# Patient Record
Sex: FEMALE | Race: WHITE | Employment: FULL TIME | URBAN - METROPOLITAN AREA
[De-identification: names, ages, dates, MRNs, and addresses within clinical notes are randomized per-mention and may not be internally consistent; named-entity substitution may affect disease eponyms.]

---

## 2020-12-29 PROCEDURE — 88305 TISSUE EXAM BY PATHOLOGIST: CPT | Performed by: OBSTETRICS & GYNECOLOGY

## 2022-01-20 PROBLEM — N96 RECURRENT PREGNANCY LOSS: Status: ACTIVE | Noted: 2022-01-20

## 2025-04-24 LAB — AMB EXT STREP B CULTURE: NEGATIVE

## 2025-05-12 ENCOUNTER — TELEPHONE (OUTPATIENT)
Dept: OBGYN UNIT | Facility: HOSPITAL | Age: 35
End: 2025-05-12

## 2025-05-12 RX ORDER — ASPIRIN 81 MG/1
81 TABLET, CHEWABLE ORAL DAILY
COMMUNITY
End: 2025-05-18

## 2025-05-15 ENCOUNTER — HOSPITAL ENCOUNTER (INPATIENT)
Facility: HOSPITAL | Age: 35
LOS: 3 days | Discharge: HOME | End: 2025-05-18
Attending: OBSTETRICS & GYNECOLOGY | Admitting: OBSTETRICS & GYNECOLOGY
Payer: COMMERCIAL

## 2025-05-15 ENCOUNTER — APPOINTMENT (OUTPATIENT)
Dept: OBGYN CLINIC | Facility: HOSPITAL | Age: 35
End: 2025-05-15
Attending: OBSTETRICS & GYNECOLOGY
Payer: COMMERCIAL

## 2025-05-15 PROBLEM — Z34.90 PREGNANCY (HCC): Status: ACTIVE | Noted: 2025-05-15

## 2025-05-15 LAB
ANTIBODY SCREEN: NEGATIVE
BASOPHILS # BLD AUTO: 0.01 X10(3) UL (ref 0–0.2)
BASOPHILS NFR BLD AUTO: 0.1 %
DEPRECATED RDW RBC AUTO: 41.9 FL (ref 35.1–46.3)
EOSINOPHIL # BLD AUTO: 0.04 X10(3) UL (ref 0–0.7)
EOSINOPHIL NFR BLD AUTO: 0.4 %
ERYTHROCYTE [DISTWIDTH] IN BLOOD BY AUTOMATED COUNT: 13.5 % (ref 11–15)
HCT VFR BLD AUTO: 35.8 % (ref 35–48)
HGB BLD-MCNC: 12 G/DL (ref 12–16)
IMM GRANULOCYTES # BLD AUTO: 0.03 X10(3) UL (ref 0–1)
IMM GRANULOCYTES NFR BLD: 0.3 %
LYMPHOCYTES # BLD AUTO: 1.98 X10(3) UL (ref 1–4)
LYMPHOCYTES NFR BLD AUTO: 20.9 %
MCH RBC QN AUTO: 28.9 PG (ref 26–34)
MCHC RBC AUTO-ENTMCNC: 33.5 G/DL (ref 31–37)
MCV RBC AUTO: 86.3 FL (ref 80–100)
MONOCYTES # BLD AUTO: 0.6 X10(3) UL (ref 0.1–1)
MONOCYTES NFR BLD AUTO: 6.3 %
NEUTROPHILS # BLD AUTO: 6.83 X10 (3) UL (ref 1.5–7.7)
NEUTROPHILS # BLD AUTO: 6.83 X10(3) UL (ref 1.5–7.7)
NEUTROPHILS NFR BLD AUTO: 72 %
PLATELET # BLD AUTO: 288 10(3)UL (ref 150–450)
RBC # BLD AUTO: 4.15 X10(6)UL (ref 3.8–5.3)
RH BLOOD TYPE: POSITIVE
RH BLOOD TYPE: POSITIVE
T PALLIDUM AB SER QL IA: NONREACTIVE
WBC # BLD AUTO: 9.5 X10(3) UL (ref 4–11)

## 2025-05-15 PROCEDURE — 86901 BLOOD TYPING SEROLOGIC RH(D): CPT | Performed by: OBSTETRICS & GYNECOLOGY

## 2025-05-15 PROCEDURE — 86850 RBC ANTIBODY SCREEN: CPT | Performed by: OBSTETRICS & GYNECOLOGY

## 2025-05-15 PROCEDURE — 86780 TREPONEMA PALLIDUM: CPT | Performed by: OBSTETRICS & GYNECOLOGY

## 2025-05-15 PROCEDURE — 86900 BLOOD TYPING SEROLOGIC ABO: CPT | Performed by: OBSTETRICS & GYNECOLOGY

## 2025-05-15 PROCEDURE — 85025 COMPLETE CBC W/AUTO DIFF WBC: CPT | Performed by: OBSTETRICS & GYNECOLOGY

## 2025-05-15 PROCEDURE — 3E0P7VZ INTRODUCTION OF HORMONE INTO FEMALE REPRODUCTIVE, VIA NATURAL OR ARTIFICIAL OPENING: ICD-10-PCS | Performed by: OBSTETRICS & GYNECOLOGY

## 2025-05-15 RX ORDER — TERBUTALINE SULFATE 1 MG/ML
0.25 INJECTION SUBCUTANEOUS AS NEEDED
Status: DISCONTINUED | OUTPATIENT
Start: 2025-05-15 | End: 2025-05-16

## 2025-05-15 RX ORDER — ACETAMINOPHEN 500 MG
500 TABLET ORAL EVERY 6 HOURS PRN
Status: DISCONTINUED | OUTPATIENT
Start: 2025-05-15 | End: 2025-05-16

## 2025-05-15 RX ORDER — HYDROXYZINE HYDROCHLORIDE 50 MG/ML
50 INJECTION, SOLUTION INTRAMUSCULAR EVERY 4 HOURS PRN
Status: DISCONTINUED | OUTPATIENT
Start: 2025-05-15 | End: 2025-05-16

## 2025-05-15 RX ORDER — ACETAMINOPHEN 500 MG
1000 TABLET ORAL EVERY 6 HOURS PRN
Status: DISCONTINUED | OUTPATIENT
Start: 2025-05-15 | End: 2025-05-16

## 2025-05-15 RX ORDER — NALBUPHINE HYDROCHLORIDE 10 MG/ML
10 INJECTION INTRAMUSCULAR; INTRAVENOUS; SUBCUTANEOUS EVERY 4 HOURS PRN
Status: DISCONTINUED | OUTPATIENT
Start: 2025-05-15 | End: 2025-05-16

## 2025-05-15 RX ORDER — SODIUM CHLORIDE, SODIUM LACTATE, POTASSIUM CHLORIDE, CALCIUM CHLORIDE 600; 310; 30; 20 MG/100ML; MG/100ML; MG/100ML; MG/100ML
INJECTION, SOLUTION INTRAVENOUS AS NEEDED
Status: DISCONTINUED | OUTPATIENT
Start: 2025-05-15 | End: 2025-05-16

## 2025-05-15 RX ORDER — LIDOCAINE HYDROCHLORIDE 10 MG/ML
30 INJECTION, SOLUTION EPIDURAL; INFILTRATION; INTRACAUDAL; PERINEURAL ONCE
Status: DISCONTINUED | OUTPATIENT
Start: 2025-05-15 | End: 2025-05-16

## 2025-05-15 RX ORDER — DEXTROSE, SODIUM CHLORIDE, SODIUM LACTATE, POTASSIUM CHLORIDE, AND CALCIUM CHLORIDE 5; .6; .31; .03; .02 G/100ML; G/100ML; G/100ML; G/100ML; G/100ML
INJECTION, SOLUTION INTRAVENOUS CONTINUOUS
Status: DISCONTINUED | OUTPATIENT
Start: 2025-05-15 | End: 2025-05-16

## 2025-05-15 RX ORDER — CITRIC ACID/SODIUM CITRATE 334-500MG
30 SOLUTION, ORAL ORAL AS NEEDED
Status: DISCONTINUED | OUTPATIENT
Start: 2025-05-15 | End: 2025-05-16

## 2025-05-15 RX ORDER — ONDANSETRON 2 MG/ML
4 INJECTION INTRAMUSCULAR; INTRAVENOUS EVERY 6 HOURS PRN
Status: DISCONTINUED | OUTPATIENT
Start: 2025-05-15 | End: 2025-05-16

## 2025-05-15 RX ORDER — IBUPROFEN 600 MG/1
600 TABLET, FILM COATED ORAL ONCE AS NEEDED
Status: DISCONTINUED | OUTPATIENT
Start: 2025-05-15 | End: 2025-05-16

## 2025-05-16 ENCOUNTER — ANESTHESIA EVENT (OUTPATIENT)
Dept: OBGYN UNIT | Facility: HOSPITAL | Age: 35
End: 2025-05-16
Payer: COMMERCIAL

## 2025-05-16 ENCOUNTER — ANESTHESIA (OUTPATIENT)
Dept: OBGYN UNIT | Facility: HOSPITAL | Age: 35
End: 2025-05-16
Payer: COMMERCIAL

## 2025-05-16 PROCEDURE — 0HQ9XZZ REPAIR PERINEUM SKIN, EXTERNAL APPROACH: ICD-10-PCS | Performed by: OBSTETRICS & GYNECOLOGY

## 2025-05-16 RX ORDER — AMMONIA 15 % (W/V)
0.3 AMPUL (EA) INHALATION AS NEEDED
Status: DISCONTINUED | OUTPATIENT
Start: 2025-05-16 | End: 2025-05-18

## 2025-05-16 RX ORDER — LIDOCAINE HYDROCHLORIDE 10 MG/ML
INJECTION, SOLUTION INFILTRATION; PERINEURAL
Status: COMPLETED | OUTPATIENT
Start: 2025-05-16 | End: 2025-05-16

## 2025-05-16 RX ORDER — CHOLECALCIFEROL (VITAMIN D3) 25 MCG
1 TABLET,CHEWABLE ORAL DAILY
Status: DISCONTINUED | OUTPATIENT
Start: 2025-05-16 | End: 2025-05-18

## 2025-05-16 RX ORDER — BISACODYL 10 MG
10 SUPPOSITORY, RECTAL RECTAL ONCE AS NEEDED
Status: DISCONTINUED | OUTPATIENT
Start: 2025-05-16 | End: 2025-05-18

## 2025-05-16 RX ORDER — DOCUSATE SODIUM 100 MG/1
100 CAPSULE, LIQUID FILLED ORAL
Status: DISCONTINUED | OUTPATIENT
Start: 2025-05-16 | End: 2025-05-18

## 2025-05-16 RX ORDER — SIMETHICONE 80 MG
80 TABLET,CHEWABLE ORAL 3 TIMES DAILY PRN
Status: DISCONTINUED | OUTPATIENT
Start: 2025-05-16 | End: 2025-05-18

## 2025-05-16 RX ORDER — ACETAMINOPHEN 500 MG
1000 TABLET ORAL EVERY 6 HOURS PRN
Status: DISCONTINUED | OUTPATIENT
Start: 2025-05-16 | End: 2025-05-18

## 2025-05-16 RX ORDER — LIDOCAINE HYDROCHLORIDE AND EPINEPHRINE 15; 5 MG/ML; UG/ML
INJECTION, SOLUTION EPIDURAL
Status: COMPLETED | OUTPATIENT
Start: 2025-05-16 | End: 2025-05-16

## 2025-05-16 RX ORDER — NALBUPHINE HYDROCHLORIDE 10 MG/ML
2.5 INJECTION INTRAMUSCULAR; INTRAVENOUS; SUBCUTANEOUS
Status: DISCONTINUED | OUTPATIENT
Start: 2025-05-16 | End: 2025-05-16

## 2025-05-16 RX ORDER — IBUPROFEN 600 MG/1
600 TABLET, FILM COATED ORAL EVERY 6 HOURS
Status: DISCONTINUED | OUTPATIENT
Start: 2025-05-16 | End: 2025-05-18

## 2025-05-16 RX ORDER — ACETAMINOPHEN 500 MG
500 TABLET ORAL EVERY 6 HOURS PRN
Status: DISCONTINUED | OUTPATIENT
Start: 2025-05-16 | End: 2025-05-18

## 2025-05-16 RX ORDER — BUPIVACAINE HCL/0.9 % NACL/PF 0.25 %
5 PLASTIC BAG, INJECTION (ML) EPIDURAL AS NEEDED
Status: DISCONTINUED | OUTPATIENT
Start: 2025-05-16 | End: 2025-05-16

## 2025-05-16 RX ORDER — BUPIVACAINE HYDROCHLORIDE 2.5 MG/ML
20 INJECTION, SOLUTION EPIDURAL; INFILTRATION; INTRACAUDAL; PERINEURAL ONCE
Status: DISCONTINUED | OUTPATIENT
Start: 2025-05-16 | End: 2025-05-16

## 2025-05-16 RX ADMIN — LIDOCAINE HYDROCHLORIDE 5 ML: 10 INJECTION, SOLUTION INFILTRATION; PERINEURAL at 03:03:00

## 2025-05-16 RX ADMIN — LIDOCAINE HYDROCHLORIDE AND EPINEPHRINE 5 ML: 15; 5 INJECTION, SOLUTION EPIDURAL at 03:03:00

## 2025-05-16 NOTE — L&D DELIVERY NOTE
Jonny Nichols [[id number]]      Labor Events     labor?: No   steroids?: None  Antibiotics received during labor?: No  Rupture date/time: 2025 0400     Rupture type: SROM  Fluid color: Pink  Labor type: Induced Onset of Labor  Induction: Misoprostol, Oxytocin  Indications for induction: Other - comment  Induction comment: AMA, high BMI  Intrapartum & labor complications: None       Labor Event Times    Labor onset date/time: 2025 0300  Dilation complete date/time: 2025 0712  Start pushing date/time: 2025 07:15       [place] Presentation    Presentation: Vertex  Position: Occiput Anterior       Operative Delivery    Operative Vaginal Delivery: No                Shoulder Dystocia    Shoulder Dystocia: No       Anesthesia    Method: Epidural              [place] Delivery      Head delivery date/time: 2025 07:20:53   Delivery date/time:  25 07:21:12   Delivery type: Normal spontaneous vaginal delivery    Details:     Delivery location: delivery room  Delivery Room Temperature: 68       Delivery Providers    Delivering Clinician: Yareli Domingo MD   Delivery personnel:  Provider Role   Rekha Castillo, RN Baby Nurse   Gaby Brizuela RN Delivery Nurse   Katy Arthur PCT             Cord    Vessels: 3 Vessels  Complications: Nuchal  # of loops: 1  Timed cord clamping: Yes  Time in sec: 120  Cord blood disposition: to lab  Gases sent?: Yes       Resuscitation    Method: None        Measurements      Weight: 3090 g 6 lb 13 oz Length: 50.2 cm     Head circum.: 35 cm              Placenta    Date/time: 2025 07:32  Removal: Spontaneous  Appearance: Intact  Disposition: Discarded       Apgars    Living status: Living   Apgar Scoring Key:    0 1 2    Skin color Blue or pale Acrocyanotic Completely pink    Heart rate Absent <100 bpm >100 bpm    Reflex irritability No response Grimace Cry or active withdrawal    Muscle tone Limp Some flexion Active  motion    Respiratory effort Absent Weak cry; hypoventilation Good, crying              1 Minute:  5 Minute:  10 Minute:  15 Minute:  20 Minute:      Skin color: 0  1       Heart rate: 2  2       Reflex irritablity: 2  2       Muscle tone: 2  2       Respiratory effort: 2  2       Total: 8  9          Apgars assigned by: KATIE VALLEJO   disposition: with mother       Skin to Skin    Skin to skin initiated date/time: 2025 0735  Skin to skin with: Mother       Vaginal Count    Initial count RN: Jamee Brizuela RN  Initial count Tech: Chun, Lisa   Sponges   Sharps    Initial counts 10   0    Final counts 10   1    Final count RN: Gaby Brizuela RN  Final count MD: Yareli Domingo MD       Lacerations    Episiotomy: None  Perineal lacerations: None      Vaginal laceration?: Yes Repaired?: Yes     Cervical laceration?: No    Clitoral laceration?: No    Quantitative blood loss (mL): 50            [facility]  part of [facility]    Vaginal Delivery Note    Sondra Nichols Patient Status:  Inpatient    3/25/1990 MRN [id number]   Location [facility] Attending Linda Pacheco MD   Hosp Day # 1 PCP ARAVIND LIU     Delivery       Diagnosis: IOL, AMA, BMI 45, recurrent pregnancy loss    Labor Details: Induction    Procedure: spontaneous vaginal delivery    Surgeon: Yareli Domingo MD    Neonatologist Present: no    Maternal Anesthesia: epidural     Infant  Date of Delivery: 2025   Time of Delivery: 7:21 AM  Delivery Type: Normal spontaneous vaginal delivery    Infant Sex  Information for the patient's :  Jonny Nichols [[id number]]   male    Infant Birthweight  Information for the patient's :  Jonny Nichols [[id number]]   6 lb 13 oz (3.09 kg)       Presentation Vertex [1]  Position   Occiput [1] Anterior [1]    Apgars:  1 minute: 8               5 minutes: 9                        10 minutes:      Placenta:  Date/Time of Delivery:  5/16/2025  7:32 AM   Delivery: spontaneous  Placenta to Pathology: no    Umbilical Cord:  Cord Gases Submitted: yes  Cord Blood/Tissue Collection: no  Cord Complications: single nuchal resolved at the perineum  Sponge and Needle Counts:  Verified yes      Episiotomy/Laceration Repair  Laceration: vaginal 1st degree  Location: midline near the posterior forchette  Suture Size/Type: 2-0 Vicryl  Anesthesia: Epidural in place  Repair Comments: Good approximation and good hemostasis obtained.    Delivery Complications  none      Hemorrhage?: No, patient is stable, asymptomatic and blood loss is within expected amount following delivery. Standard treatment provided to prevent PPH. Patient does not meet ACOG criteria for hemorrhage at this time.    QBL: Quantitative Blood Loss (mL) 50    Delivery Narrative: Patient pushed and delivered a live male (Edenilson) in OA position,over intact perineum.  Upon delivery of the fetal head, the neck was checked for a nuchal cord.   If needed, the nose and mouth were bulb suctioned.  Infant was delivered in total.     Delayed cord clamping was performed.  Umbilical cord was doubly clamped & cut.   Infant handed to awaiting mother with nurses at bedside.   Lacerations and repair as noted above  Cervix was inspected and no cervical lacerations or trailing membranes noted.  No uterine inversion noted.  No periuretheral / sulcus lacerations.   Placenta was delivered spontaneously intact & normal in appearance with 3 vessel cord.       Yareli Domingo MD   5/16/2025  8:16 AM

## 2025-05-16 NOTE — LACTATION NOTE
25 1728   Evaluation Type   Evaluation Type Inpatient   Problems identified   Problems identified Knowledge deficit   Maternal history   Maternal history AMA;Induction of labor   Breastfeeding goal   Breastfeeding goal To maintain breast milk feeding per patient goal   Maternal Assessment   Right Breast Soft   Right Nipple Everted   Prior breastfeeding experience (comment below) Multip;Unsuccessful   Breastfeeding Assistance Breastfeeding assistance provided with permission;Hand expression provided with permission   Pain assessment   Location/Comment denies   Guidelines for use of:   Current use of pump: not indicated at this time.   Other (comment) Observed mom breastfeeding baby. Baby sleepy, but intermittently suckling. Mom denies pain with feeding and nipple rounded after he unlatched. Initial breastfeeding education done. Reviewed normal  feeding patterns and behaviors, monitoring diaper output, nutritive vs non nutritive suckling and deep vs shallow latch. Taught mother hand expression. Mom with return demonstration. Discussed feeding frequency and gentle waking techniques. Encouraged plenty of skin to skin.

## 2025-05-16 NOTE — ANESTHESIA PROCEDURE NOTES
Labor Analgesia    Date/Time: 5/16/2025 3:03 AM    Performed by: Jahaira Reid MD  Authorized by: Jahaira Reid MD      General Information and Staff    Start Time:  5/16/2025 3:03 AM  End Time:  5/16/2025 3:16 AM  Anesthesiologist:  Jahaira Reid MD  Performed by:  Anesthesiologist  Patient Location:  OB  Site Identification: surface landmarks    Reason for Block: labor epidural    Preanesthetic Checklist: patient identified, IV checked, site marked, risks and benefits discussed, monitors and equipment checked, pre-op evaluation, timeout performed, IV bolus, anesthesia consent and sterile technique used      Procedure Details    Patient Position:  Sitting  Prep: ChloraPrep and patient draped    Monitoring:  Heart rate, cardiac monitor and continuous pulse ox  Approach:  Midline    Epidural Needle    Injection Technique:  BHUPINDER saline  Needle Type:  Tuohy  Needle Gauge:  18 G  Needle Length:  3.5 in  Needle Insertion Depth:  5  Location:  L2-3    Spinal Needle      Catheter    Catheter Type:  Multi-orifice  Catheter Size:  20 G  Catheter at Skin Depth:  12  Test Dose:  Negative    Assessment    Sensory Level:  T8    Additional Comments

## 2025-05-16 NOTE — H&P
[facility]  part of [facility]    History & Physical    Sondra Nichols Patient Status:  Inpatient    3/25/1990 MRN [id number]   Location [facility] Attending Linda Pacheco MD   Hosp Day # 1 PCP ARAVIND LIU     Date of Admission:  5/15/2025    Pt arrived overnight.  I saw pt this am when I started my call (2025).  HPI:   Sondra Nichols is a 35 year old  female, current EGA of 40w0d with an estimated date of delivery of: 2025, by Last Menstrual Period who presents due to IOL.    Being admitted for induction of labor.      Pt denies N/V/F/C/CP/SOB, HA, blurry vision, dizziness, RUQ pain, ctx, lof, VB.    Her current obstetrical history is significant for Rubella non-immune, Recurrent pregnancy loss, AMA and BMI 45.  Problem List[1]      Fetal Movement reported as good.  GBS negative.   Rh positive.    History     Obstetric History:   OB History    Para Term  AB Living   4 1 1  2 1   SAB IAB Ectopic Multiple Live Births   1    1      # Outcome Date GA Lbr Salvatore/2nd Weight Sex Type Anes PTL Lv   4 Current            3 SAB 22        FD   2 AB 20 9w0d    SAB   FD   1 Term 16    F NORMAL SPONT   DAMION       Gyne History:   Last pap smear: 2024: Negative cytology and HR-HPV not detected    Past Medical History: Past Medical History[2]    Past Surgerical History: Past Surgical History[3]    Social History:   Social History     Tobacco Use    Smoking status: Never    Smokeless tobacco: Never   Substance Use Topics    Alcohol use: Yes     Comment: Social        Allergies/Medications:   Allergies:   Allergies[4]    Medications:  Prescriptions Prior to Admission[5]      Review of Systems:   As documented in HPI      Physical Exam:   Temp:  [98.6 °F (37 °C)-98.8 °F (37.1 °C)] 98.6 °F (37 °C)  Pulse:  [] 110  Resp:  [16-20] 20  BP: ()/(41-90) 92/46  SpO2:  [94 %-100 %] 97 %    Constitutional: alert and  cooperative in No distress    Abdomen: soft,  nontender, gravid    Vaginal exam: Per RN  Dilation: 2 cm    Effacement: 40 %    Station: 0        FHT assessment:   Baseline: 120 bpm   Variability: moderate   Accels:  present   Decels: No   Tocos:  irregular   Category: 1 tracing    Neurologic: Alert and oriented  Psychiatric: Cooperative    Results:     Recent Results (from the past 24 hours)   CBC With Differential With Platelet    Collection Time: 05/15/25  6:31 PM   Result Value Ref Range    WBC 9.5 4.0 - 11.0 x10(3) uL    RBC 4.15 3.80 - 5.30 x10(6)uL    HGB 12.0 12.0 - 16.0 g/dL    HCT 35.8 35.0 - 48.0 %    MCV 86.3 80.0 - 100.0 fL    MCH 28.9 26.0 - 34.0 pg    MCHC 33.5 31.0 - 37.0 g/dL    RDW-SD 41.9 35.1 - 46.3 fL    RDW 13.5 11.0 - 15.0 %    .0 150.0 - 450.0 10(3)uL    Neutrophil Absolute Prelim 6.83 1.50 - 7.70 x10 (3) uL    Neutrophil Absolute 6.83 1.50 - 7.70 x10(3) uL    Lymphocyte Absolute 1.98 1.00 - 4.00 x10(3) uL    Monocyte Absolute 0.60 0.10 - 1.00 x10(3) uL    Eosinophil Absolute 0.04 0.00 - 0.70 x10(3) uL    Basophil Absolute 0.01 0.00 - 0.20 x10(3) uL    Immature Granulocyte Absolute 0.03 0.00 - 1.00 x10(3) uL    Neutrophil % 72.0 %    Lymphocyte % 20.9 %    Monocyte % 6.3 %    Eosinophil % 0.4 %    Basophil % 0.1 %    Immature Granulocyte % 0.3 %   T Pallidum Screening Cascade    Collection Time: 05/15/25  6:31 PM   Result Value Ref Range    Treponemal Antibodies Nonreactive Nonreactive    ABORH (Blood Type)    Collection Time: 05/15/25  6:31 PM   Result Value Ref Range    ABO BLOOD TYPE O     RH BLOOD TYPE Positive    Antibody Screen    Collection Time: 05/15/25  6:31 PM   Result Value Ref Range    Antibody Screen Negative    ABORH Confirmation    Collection Time: 05/15/25  7:51 PM   Result Value Ref Range    ABO BLOOD TYPE O     RH BLOOD TYPE Positive        No results found.        Assessment/Plan:   IUP 40w0d  in / with Early latent labor.    Obstetrical history significant for  Rubella non-immune, Recurrent pregnancy loss, AMA and BMI 45. Problem List[6]      Treatment Plan:  Presented for IOL    Sondra Nichols is a 35 year old  female, current EGA of 40w0d who presents for admission due to IOL    Risks, benefits, alternatives and possible complications have been discussed in detail with the patient.   Pre-admission, admission, and post admission procedures and expectations were discussed in detail.    All questions answered; all appropriate consents will be signed at the Hospital.    IUP at 40w0d  Fetal heart tones category 1  IOL: admit, routine labs, Cytotec for cervical ripening, followed by Pitocin; epidural if patient desires  GBS negative  CPM      Yareli Domingo MD  2025  8:02 AM        [1]   Patient Active Problem List  Diagnosis    Recurrent pregnancy loss    Pregnancy (HCC)   [2]   Past Medical History:   AMA (advanced maternal age) multigravida 35+ (HCC)    Recurrent pregnancy loss   [3] No past surgical history on file.  [4] No Known Allergies  [5]   Medications Prior to Admission   Medication Sig Dispense Refill Last Dose/Taking    aspirin 81 MG Oral Chew Tab Chew 1 tablet (81 mg total) by mouth daily.   2025 Bedtime    Prenatal MV-Min-Fe Fum-FA-DHA (PRENATAL 1 OR) Take by mouth.   2025 Bedtime   [6]   Patient Active Problem List  Diagnosis    Recurrent pregnancy loss    Pregnancy (HCC)

## 2025-05-16 NOTE — PLAN OF CARE
Problem: PAIN - ADULT  Goal: Verbalizes/displays adequate comfort level or patient's stated pain goal  Description: INTERVENTIONS:  - Encourage pt to monitor pain and request assistance  - Assess pain using appropriate pain scale  - Administer analgesics based on type and severity of pain and evaluate response  - Implement non-pharmacological measures as appropriate and evaluate response  - Consider cultural and social influences on pain and pain management  - Manage/alleviate anxiety  - Utilize distraction and/or relaxation techniques  - Monitor for opioid side effects  - Notify MD/LIP if interventions unsuccessful or patient reports new pain  - Anticipate increased pain with activity and pre-medicate as appropriate  Outcome: Progressing     Problem: ANXIETY  Goal: Will report anxiety at manageable levels  Description: INTERVENTIONS:  - Administer medication as ordered  - Teach and rehearse alternative coping skills  - Provide emotional support with 1:1 interaction with staff  Outcome: Progressing     Problem: GENITOURINARY - ADULT  Goal: Absence of urinary retention  Description: INTERVENTIONS:  - Assess patient’s ability to void and empty bladder  - Monitor intake/output and perform bladder scan as needed  - Follow urinary retention protocol/standard of care  - Consider collaborating with pharmacy to review patient's medication profile  - Implement strategies to promote bladder emptying  Outcome: Progressing     Problem: POSTPARTUM  Goal: Long Term Goal:Experiences normal postpartum course  Description: INTERVENTIONS:  - Assess and monitor vital signs and lab values.  - Assess fundus and lochia.  - Provide ice/sitz baths for perineum discomfort.  - Monitor healing of incision/episiotomy/laceration, and assess for signs and symptoms of infection and hematoma.  - Assess bladder function and monitor for bladder distention.  - Provide/instruct/assist with pericare as needed.  - Provide VTE prophylaxis as needed.  -  Monitor bowel function.  - Encourage ambulation and provide assistance as needed.  - Assess and monitor emotional status and provide social service/psych resources as needed.  - Utilize standard precautions and use personal protective equipment as indicated. Ensure aseptic care of all intravenous lines and invasive tubes/drains.  - Obtain immunization and exposure to communicable diseases history.  Outcome: Progressing  Goal: Optimize infant feeding at the breast  Description: INTERVENTIONS:  - Initiate breast feeding within first hour after birth.   - Monitor effectiveness of current breast feeding efforts.  - Assess support systems available to mother/family.  - Identify cultural beliefs/practices regarding lactation, letdown techniques, maternal food preferences.  - Assess mother's knowledge and previous experience with breast feeding.  - Provide information as needed about early infant feeding cues (e.g., rooting, lip smacking, sucking fingers/hand) versus late cue of crying.  - Discuss/demonstrate breast feeding aids (e.g., infant sling, nursing footstool/pillows, and breast pumps).  - Encourage mother/other family members to express feelings/concerns, and actively listen.  - Educate father/SO about benefits of breast feeding and how to manage common lactation challenges.  - Recommend avoidance of specific medications or substances incompatible with breast feeding.  - Assess and monitor for signs of nipple pain/trauma.  - Instruct and provide assistance with proper latch.  - Review techniques for milk expression (breast pumping) and storage of breast milk. Provide pumping equipment/supplies, instructions and assistance, as needed.  - Encourage rooming-in and breast feeding on demand.  - Encourage skin-to-skin contact.  - Provide LC support as needed.  - Assess for and manage engorgement.  - Provide breast feeding education handouts and information on community breast feeding support.   Outcome:  Progressing  Goal: Establishment of adequate milk supply with medication/procedure interruptions  Description: INTERVENTIONS:  - Review techniques for milk expression (breast pumping).   - Provide pumping equipment/supplies, instructions, and assistance until it is safe to breastfeed infant.  Outcome: Progressing  Goal: Experiences normal breast weaning course  Description: INTERVENTIONS:  - Assess for and manage engorgement.  - Instruct on breast care.  - Provide comfort measures.  Outcome: Progressing  Goal: Appropriate maternal -  bonding  Description: INTERVENTIONS:  - Assess caregiver- interactions.  - Assess caregiver's emotional status and coping mechanisms.  - Encourage caregiver to participate in  daily care.  - Assess support systems available to mother/family.  - Provide /case management support as needed.  Outcome: Progressing     Problem: BIRTH - VAGINAL/ SECTION  Goal: Fetal and maternal status remain reassuring during the birth process  Description: INTERVENTIONS:  - Monitor vital signs  - Monitor fetal heart rate  - Monitor uterine activity  - Monitor labor progression (vaginal delivery)  - DVT prophylaxis (C/S delivery)  - Surgical antibiotic prophylaxis (C/S delivery)  Outcome: Completed     Problem: Patient Centered Care  Goal: Patient preferences are identified and integrated in the patient's plan of care  Description: Interventions:  - What would you like us to know as we care for you?   - Provide timely, complete, and accurate information to patient/family  - Incorporate patient and family knowledge, values, beliefs, and cultural backgrounds into the planning and delivery of care  - Encourage patient/family to participate in care and decision-making at the level they choose  - Honor patient and family perspectives and choices  Outcome: Completed     Problem: Patient/Family Goals  Goal: Patient/Family Long Term Goal  Description: Patient's Long Term  Goal:     Interventions:    - See additional Care Plan goals for specific interventions  Outcome: Completed  Goal: Patient/Family Short Term Goal  Description: Patient's Short Term Goal:     Interventions:   - See additional Care Plan goals for specific interventions  Outcome: Completed     Received pt via wheelchair to room 349. Bedside report received from YONI Griffin. Pt transferred to bed without incident. Bed locked and in low position, side rails up x2. VSS. Baby Boy present at bedside in open crib. ID bands verified. Pt and family oriented to room, unit and call light. Call light within pt reach. Pt instructed to call for assistance out of bed or up to washroom. Pt verbalized understanding.

## 2025-05-16 NOTE — PROGRESS NOTES
Pt is a 35 year old female admitted to LDR5/LDR5-A.     Chief Complaint   Patient presents with    Scheduled Induction     AMA      Pt is  39w6d intra-uterine pregnancy.  History obtained, consents signed. Oriented to room, staff, and plan of care.

## 2025-05-17 LAB
BASOPHILS # BLD AUTO: 0.02 X10(3) UL (ref 0–0.2)
BASOPHILS NFR BLD AUTO: 0.2 %
DEPRECATED RDW RBC AUTO: 44.5 FL (ref 35.1–46.3)
EOSINOPHIL # BLD AUTO: 0.08 X10(3) UL (ref 0–0.7)
EOSINOPHIL NFR BLD AUTO: 0.8 %
ERYTHROCYTE [DISTWIDTH] IN BLOOD BY AUTOMATED COUNT: 13.9 % (ref 11–15)
HCT VFR BLD AUTO: 31 % (ref 35–48)
HGB BLD-MCNC: 10.2 G/DL (ref 12–16)
IMM GRANULOCYTES # BLD AUTO: 0.06 X10(3) UL (ref 0–1)
IMM GRANULOCYTES NFR BLD: 0.6 %
LYMPHOCYTES # BLD AUTO: 2.57 X10(3) UL (ref 1–4)
LYMPHOCYTES NFR BLD AUTO: 24.6 %
MCH RBC QN AUTO: 28.9 PG (ref 26–34)
MCHC RBC AUTO-ENTMCNC: 32.9 G/DL (ref 31–37)
MCV RBC AUTO: 87.8 FL (ref 80–100)
MONOCYTES # BLD AUTO: 0.81 X10(3) UL (ref 0.1–1)
MONOCYTES NFR BLD AUTO: 7.7 %
NEUTROPHILS # BLD AUTO: 6.92 X10 (3) UL (ref 1.5–7.7)
NEUTROPHILS # BLD AUTO: 6.92 X10(3) UL (ref 1.5–7.7)
NEUTROPHILS NFR BLD AUTO: 66.1 %
PLATELET # BLD AUTO: 244 10(3)UL (ref 150–450)
RBC # BLD AUTO: 3.53 X10(6)UL (ref 3.8–5.3)
WBC # BLD AUTO: 10.5 X10(3) UL (ref 4–11)

## 2025-05-17 PROCEDURE — 85025 COMPLETE CBC W/AUTO DIFF WBC: CPT | Performed by: OBSTETRICS & GYNECOLOGY

## 2025-05-17 NOTE — ANESTHESIA POSTPROCEDURE EVALUATION
Patient: Sondra Nichols    Procedure Summary       Date: 05/16/25 Room / Location:     Anesthesia Start: 0303 Anesthesia Stop: 0732    Procedure: LABOR ANALGESIA Diagnosis:     Scheduled Providers:  Anesthesiologist: Bettie Bassett DO    Anesthesia Type: epidural ASA Status: 2            Anesthesia Type: epidural    Vitals Value Taken Time   /57 05/16/25 1216   Temp 99.0 F 05/16/25 1216   Pulse 72 05/16/25 1216   Resp 26 05/16/25 1216   SpO2 98 % 05/16/25 1216   Vitals shown include unfiled device data.    EM AN Post Evaluation:   Patient Evaluated in floor  Patient Participation: complete - patient participated  Level of Consciousness: awake and alert  Pain Score: 0  Pain Management: satisfactory to patient  Airway Patency:patent  Yes    Nausea/Vomiting: none  Cardiovascular Status: hemodynamically stable  Respiratory Status: spontaneous ventilation, room air and nonlabored ventilation  Postoperative Hydration stable      Bettie Bassett DO  5/16/2025 8:14 PM

## 2025-05-17 NOTE — PROGRESS NOTES
[facility]  part of [facility]    OB/Gyne Post  Progress Note      Sondra Nichols Patient Status:  Inpatient    3/25/1990 MRN [id number]   Location [facility] 3SE Attending Linda Pacheco MD   Hosp Day # 2 PCP ARAVIND LIU       Subjective     Pt denies N/V/F/C/CP/SOB/palpitations, dizziness, headache, blurry vision, leg pain/calf pain.       Good pain control.   Tolerating present diet.   Ambulating well. Voiding freely.  Breastfeeding: Yes   Vaginal bleeding: Decreasing     Objective   Vital signs in last 24 hours:  Temp:  [98.1 °F (36.7 °C)-99 °F (37.2 °C)] 98.1 °F (36.7 °C)  Pulse:  [] 64  Resp:  [14-16] 14  BP: ()/(35-69) 107/60  SpO2:  [94 %-98 %] 97 %    Input/Output:    Intake/Output Summary (Last 24 hours) at 2025 0727  Last data filed at 2025 1600  Gross per 24 hour   Intake --   Output 1050 ml   Net -1050 ml         Constitutional: comfortable  Abdomen: soft, nontender, nondistended  Uterus: fundus firm and 2 cm below umbilicus,   Extremities: No calf tenderness; no c/c/e      Results:   Labs / Path / Radiology:    Recent Results (from the past 24 hours)   CBC With Differential With Platelet    Collection Time: 25  5:44 AM   Result Value Ref Range    WBC 10.5 4.0 - 11.0 x10(3) uL    RBC 3.53 (L) 3.80 - 5.30 x10(6)uL    HGB 10.2 (L) 12.0 - 16.0 g/dL    HCT 31.0 (L) 35.0 - 48.0 %    MCV 87.8 80.0 - 100.0 fL    MCH 28.9 26.0 - 34.0 pg    MCHC 32.9 31.0 - 37.0 g/dL    RDW-SD 44.5 35.1 - 46.3 fL    RDW 13.9 11.0 - 15.0 %    .0 150.0 - 450.0 10(3)uL    Neutrophil Absolute Prelim 6.92 1.50 - 7.70 x10 (3) uL    Neutrophil Absolute 6.92 1.50 - 7.70 x10(3) uL    Lymphocyte Absolute 2.57 1.00 - 4.00 x10(3) uL    Monocyte Absolute 0.81 0.10 - 1.00 x10(3) uL    Eosinophil Absolute 0.08 0.00 - 0.70 x10(3) uL    Basophil Absolute 0.02 0.00 - 0.20 x10(3) uL    Immature Granulocyte Absolute 0.06 0.00 - 1.00 x10(3) uL    Neutrophil % 66.1 %     Lymphocyte % 24.6 %    Monocyte % 7.7 %    Eosinophil % 0.8 %    Basophil % 0.2 %    Immature Granulocyte % 0.6 %       Specimens (From admission, onward)      None            No results found.      Assessment/Plan   35 year oldyo  , s/p spontaneous vaginal, PPD# 2     Problem List[1].    Postpartum:  -Pt doing well  -Pain tolerable and controlled  -Breastfeeding, lactation consultant available fo assistance    2. Heme:  Hgb s/p delivery 10.2  Acute blood loss anemia  Asymptomatic and hemodynamically stable  Will encourage cont PNV and increase intake of iron rich foods    3. Disposition:  ambulate, continue routine postpartum care        The patient had a male infant, and does desire circumcision.  She was consented for infant circumcision risks including, but not limited to, bleeding, infection, trauma to other tissue, and need for further procedures.  The patient expressed understanding, denied questions, and wishes to proceed with the procedure for her son.      Cat Mack MD  2025  7:27 AM           [1]   Patient Active Problem List  Diagnosis    Recurrent pregnancy loss    Pregnancy (HCC)

## 2025-05-17 NOTE — LACTATION NOTE
05/17/25 1400   Evaluation Type   Evaluation Type Inpatient   Problems identified   Problems Identified Other Lactation visit as per protocol, on follow up to be seen. Pt verbalized plan of pumping and offer bottle of EBM/ABM.   Maternal history   Maternal history AMA;Induction of labor   Breastfeeding goal   Breastfeeding goal To maintain breast milk feeding per patient goal   Maternal Assessment   Prior breastfeeding experience (comment below) Multip;Unsuccessful   Breastfeeding Assistance LC assistance declined at this time   Pain assessment   Location/Comment denies   Guidelines for use of:   Breast pump type Hand Pump   Current use of pump: encouraged/offered hospital grade electric pump with continued supplementation, pt opts to use hand pump at this time.   Suggested use of pump Pump each time a supplement is offered;Pump if infant is not latching to breast   Reported pumping volumes (ml) 2 ml   Other (comment) Family present at beside visiting, reviewed few asked questions, supported stated goal. Encouraged to request lactation assistance as needed, report given to MB RN, encouraged to request lactation as needed.

## 2025-05-18 VITALS
OXYGEN SATURATION: 97 % | SYSTOLIC BLOOD PRESSURE: 110 MMHG | BODY MASS INDEX: 45.94 KG/M2 | RESPIRATION RATE: 16 BRPM | DIASTOLIC BLOOD PRESSURE: 67 MMHG | HEART RATE: 62 BPM | HEIGHT: 60 IN | TEMPERATURE: 98 F | WEIGHT: 234 LBS

## 2025-05-18 PROBLEM — Z34.90 PREGNANCY (HCC): Status: RESOLVED | Noted: 2025-05-15 | Resolved: 2025-05-18

## 2025-05-18 PROCEDURE — 3E0234Z INTRODUCTION OF SERUM, TOXOID AND VACCINE INTO MUSCLE, PERCUTANEOUS APPROACH: ICD-10-PCS | Performed by: OBSTETRICS & GYNECOLOGY

## 2025-05-18 PROCEDURE — 90471 IMMUNIZATION ADMIN: CPT

## 2025-05-18 RX ORDER — IBUPROFEN 200 MG
600 TABLET ORAL EVERY 6 HOURS
Qty: 30 TABLET | Refills: 0 | Status: SHIPPED | COMMUNITY
Start: 2025-05-18

## 2025-05-18 NOTE — DISCHARGE INSTRUCTIONS
Call your doctor if you are experiencing heavy bleeding, increased pain not relieved with medication, temperature over 100.3, severe headache, change in vision, calf pain/swelling, change in mood/depression.    Driving:  When driving maneuvers feel safe. Not taking narcotics.    Nothing in the vagina for 6 weeks    No heaving lifting over 10-15# until cleared by OB.

## 2025-05-18 NOTE — PLAN OF CARE
Problem: PAIN - ADULT  Goal: Verbalizes/displays adequate comfort level or patient's stated pain goal  Description: INTERVENTIONS:  - Encourage pt to monitor pain and request assistance  - Assess pain using appropriate pain scale  - Administer analgesics based on type and severity of pain and evaluate response  - Implement non-pharmacological measures as appropriate and evaluate response  - Consider cultural and social influences on pain and pain management  - Manage/alleviate anxiety  - Utilize distraction and/or relaxation techniques  - Monitor for opioid side effects  - Notify MD/LIP if interventions unsuccessful or patient reports new pain  - Anticipate increased pain with activity and pre-medicate as appropriate  Outcome: Completed     Problem: ANXIETY  Goal: Will report anxiety at manageable levels  Description: INTERVENTIONS:  - Administer medication as ordered  - Teach and rehearse alternative coping skills  - Provide emotional support with 1:1 interaction with staff  Outcome: Completed     Problem: GENITOURINARY - ADULT  Goal: Absence of urinary retention  Description: INTERVENTIONS:  - Assess patient’s ability to void and empty bladder  - Monitor intake/output and perform bladder scan as needed  - Follow urinary retention protocol/standard of care  - Consider collaborating with pharmacy to review patient's medication profile  - Implement strategies to promote bladder emptying  Outcome: Completed     Problem: POSTPARTUM  Goal: Long Term Goal:Experiences normal postpartum course  Description: INTERVENTIONS:  - Assess and monitor vital signs and lab values.  - Assess fundus and lochia.  - Provide ice/sitz baths for perineum discomfort.  - Monitor healing of incision/episiotomy/laceration, and assess for signs and symptoms of infection and hematoma.  - Assess bladder function and monitor for bladder distention.  - Provide/instruct/assist with pericare as needed.  - Provide VTE prophylaxis as needed.  -  Monitor bowel function.  - Encourage ambulation and provide assistance as needed.  - Assess and monitor emotional status and provide social service/psych resources as needed.  - Utilize standard precautions and use personal protective equipment as indicated. Ensure aseptic care of all intravenous lines and invasive tubes/drains.  - Obtain immunization and exposure to communicable diseases history.  Outcome: Completed  Goal: Optimize infant feeding at the breast  Description: INTERVENTIONS:  - Initiate breast feeding within first hour after birth.   - Monitor effectiveness of current breast feeding efforts.  - Assess support systems available to mother/family.  - Identify cultural beliefs/practices regarding lactation, letdown techniques, maternal food preferences.  - Assess mother's knowledge and previous experience with breast feeding.  - Provide information as needed about early infant feeding cues (e.g., rooting, lip smacking, sucking fingers/hand) versus late cue of crying.  - Discuss/demonstrate breast feeding aids (e.g., infant sling, nursing footstool/pillows, and breast pumps).  - Encourage mother/other family members to express feelings/concerns, and actively listen.  - Educate father/SO about benefits of breast feeding and how to manage common lactation challenges.  - Recommend avoidance of specific medications or substances incompatible with breast feeding.  - Assess and monitor for signs of nipple pain/trauma.  - Instruct and provide assistance with proper latch.  - Review techniques for milk expression (breast pumping) and storage of breast milk. Provide pumping equipment/supplies, instructions and assistance, as needed.  - Encourage rooming-in and breast feeding on demand.  - Encourage skin-to-skin contact.  - Provide LC support as needed.  - Assess for and manage engorgement.  - Provide breast feeding education handouts and information on community breast feeding support.   Outcome: Completed  Goal:  Establishment of adequate milk supply with medication/procedure interruptions  Description: INTERVENTIONS:  - Review techniques for milk expression (breast pumping).   - Provide pumping equipment/supplies, instructions, and assistance until it is safe to breastfeed infant.  Outcome: Completed  Goal: Experiences normal breast weaning course  Description: INTERVENTIONS:  - Assess for and manage engorgement.  - Instruct on breast care.  - Provide comfort measures.  Outcome: Completed  Goal: Appropriate maternal -  bonding  Description: INTERVENTIONS:  - Assess caregiver- interactions.  - Assess caregiver's emotional status and coping mechanisms.  - Encourage caregiver to participate in  daily care.  - Assess support systems available to mother/family.  - Provide /case management support as needed.  Outcome: Completed    Discharge order received from MD.  Postpartum folder given, discharge medication form reviewed, signed and given to patient. ID Band matched with baby band. Patient informed when to make a follow-up appointment with OB. Mother is interacting appropriately with baby. Verbalized understanding of follow-up instructions. Discharged in stable condition via wheelchair.

## 2025-05-18 NOTE — DISCHARGE SUMMARY
[facility]  part of [facility]    Obstetrical Discharge Summary    Sondra Nichols Patient Status:  Inpatient    3/25/1990 MRN [id number]   Location [facility] 3SE Attending Linda Pacheco MD   Hosp Day # 3 PCP ARAVIND LIU     Date of Admission: 5/15/2025     Date of Discharge: 2025    Admitting Diagnosis: pregnancy  Pregnancy (HCC)    Discharge Diagnosis: .Active Problems:    * No active hospital problems. *      Disposition: Home    Hospital Course:   Reason for Admission:  Sondra Nichols is a 35 year old  who was admitted with Estimated Date of Delivery: 25. She presented for induction of labor.  Pregnancy was complicated by:  Patient Active Problem List    Diagnosis Date Noted    Recurrent pregnancy loss 2022       Hospital Course: This is a 35 year old  who was admitted with Estimated Date of Delivery: 25     Pt underwent a spontaneous vaginal. Refer to delivery note/ operative report for details.  Pt was transferred to mother/ baby.  She underwent an uncomplicated postpartum course.  Pt is being discharged today.    Date of Delivery: 2025   Time of Delivery: 7:21 AM  Delivery Type: Normal spontaneous vaginal delivery    Live   Information for the patient's :  Jonny Nichols [[id number]]   male   infant   Information for the patient's :  Jonny Nichols [[id number]]   6 lb 13 oz (3.09 kg)     Apgars:  1 minute: 8               5 minutes: 9                        10 minutes:        Postpartum Course: Her postpartum course was uncomplicated except for asymptomatic anemia.    Discharge Physical Exam:   /74 (BP Location: Right arm)   Pulse 75   Temp 98.5 °F (36.9 °C) (Oral)   Resp 14   Ht 5' (1.524 m)   Wt 234 lb (106.1 kg)   LMP 2024   SpO2 97%   Breastfeeding Yes   BMI 45.70 kg/m²   General appearance:  alert, appears stated age, cooperative and no distress  Abdominal: soft,  no rebound; no  guarding; appropriately tender  Uterus: firm, nontender, below umbilicus  Pelvic: deferred  Extremities: Homans sign is negative, no sign of DVT; no c/c/e      Results:   Recent Results (from the past 2 weeks)   CBC With Differential With Platelet    Collection Time: 05/15/25  6:31 PM   Result Value Ref Range    WBC 9.5 4.0 - 11.0 x10(3) uL    RBC 4.15 3.80 - 5.30 x10(6)uL    HGB 12.0 12.0 - 16.0 g/dL    HCT 35.8 35.0 - 48.0 %    MCV 86.3 80.0 - 100.0 fL    MCH 28.9 26.0 - 34.0 pg    MCHC 33.5 31.0 - 37.0 g/dL    RDW-SD 41.9 35.1 - 46.3 fL    RDW 13.5 11.0 - 15.0 %    .0 150.0 - 450.0 10(3)uL    Neutrophil Absolute Prelim 6.83 1.50 - 7.70 x10 (3) uL    Neutrophil Absolute 6.83 1.50 - 7.70 x10(3) uL    Lymphocyte Absolute 1.98 1.00 - 4.00 x10(3) uL    Monocyte Absolute 0.60 0.10 - 1.00 x10(3) uL    Eosinophil Absolute 0.04 0.00 - 0.70 x10(3) uL    Basophil Absolute 0.01 0.00 - 0.20 x10(3) uL    Immature Granulocyte Absolute 0.03 0.00 - 1.00 x10(3) uL    Neutrophil % 72.0 %    Lymphocyte % 20.9 %    Monocyte % 6.3 %    Eosinophil % 0.4 %    Basophil % 0.1 %    Immature Granulocyte % 0.3 %   T Pallidum Screening Cascade    Collection Time: 05/15/25  6:31 PM   Result Value Ref Range    Treponemal Antibodies Nonreactive Nonreactive    ABORH (Blood Type)    Collection Time: 05/15/25  6:31 PM   Result Value Ref Range    ABO BLOOD TYPE O     RH BLOOD TYPE Positive    Antibody Screen    Collection Time: 05/15/25  6:31 PM   Result Value Ref Range    Antibody Screen Negative    ABORH Confirmation    Collection Time: 05/15/25  7:51 PM   Result Value Ref Range    ABO BLOOD TYPE O     RH BLOOD TYPE Positive    CBC With Differential With Platelet    Collection Time: 05/17/25  5:44 AM   Result Value Ref Range    WBC 10.5 4.0 - 11.0 x10(3) uL    RBC 3.53 (L) 3.80 - 5.30 x10(6)uL    HGB 10.2 (L) 12.0 - 16.0 g/dL    HCT 31.0 (L) 35.0 - 48.0 %    MCV 87.8 80.0 - 100.0 fL    MCH 28.9 26.0 - 34.0 pg    MCHC 32.9 31.0 - 37.0 g/dL     RDW-SD 44.5 35.1 - 46.3 fL    RDW 13.9 11.0 - 15.0 %    .0 150.0 - 450.0 10(3)uL    Neutrophil Absolute Prelim 6.92 1.50 - 7.70 x10 (3) uL    Neutrophil Absolute 6.92 1.50 - 7.70 x10(3) uL    Lymphocyte Absolute 2.57 1.00 - 4.00 x10(3) uL    Monocyte Absolute 0.81 0.10 - 1.00 x10(3) uL    Eosinophil Absolute 0.08 0.00 - 0.70 x10(3) uL    Basophil Absolute 0.02 0.00 - 0.20 x10(3) uL    Immature Granulocyte Absolute 0.06 0.00 - 1.00 x10(3) uL    Neutrophil % 66.1 %    Lymphocyte % 24.6 %    Monocyte % 7.7 %    Eosinophil % 0.8 %    Basophil % 0.2 %    Immature Granulocyte % 0.6 %     No results for input(s): \"ABORH\" in the last 72 hours.  Recent Labs     05/15/25  1831 05/17/25  0544   WBC 9.5 10.5   HGB 12.0 10.2*   HCT 35.8 31.0*     No results found.    Complications: None    Consults: No     Surgeries:     Pending Labs:     Discharge Plan:   Discharge Condition: Good    Discharge Meds:   Current Discharge Medication List        New Orders    Details   ibuprofen 200 MG Oral Tab Take 3 tablets (600 mg total) by mouth every 6 (six) hours.           Home Meds - Unchanged    Details   Prenatal MV-Min-Fe Fum-FA-DHA (PRENATAL 1 OR) Take by mouth.                   Discharge Diet: General diet    Discharge Activity: Pelvic rest until cleared  No heavy lifting >25-30 lbs  Abstain from sexual intercourse until cleared at 6 wks PP  No driving the first week and when on opiates  Activity as tolerated.    No exercise until cleared at 6 wks PP    Follow up:      Follow-up Information       Yareli Domingo MD Follow up in 6 week(s).    Specialty: OBSTETRICS & GYNECOLOGY  Contact information:  [address] [place] [id number] [phone]                             Follow up in 6 weeks.        Cat Mack MD  5/18/2025

## 2025-05-18 NOTE — PROGRESS NOTES
[facility]  part of [facility]    OB/Gyne Post  Progress Note      Sondra Nichols Patient Status:  Inpatient    3/25/1990 MRN [id number]   Location [facility] 3SE Attending Linda Pacheco MD   Hosp Day # 3 PCP ARAVIND LIU       Subjective     Pt denies N/V/F/C/CP/SOB/palpitations, dizziness, headache, blurry vision, leg pain/calf pain.       Good pain control.   Tolerating present diet.   Ambulating well. Voiding freely.  Breastfeeding: Yes   Vaginal bleeding: Minimal     Objective   Vital signs in last 24 hours:  Temp:  [98.1 °F (36.7 °C)-98.5 °F (36.9 °C)] 98.5 °F (36.9 °C)  Pulse:  [72-75] 75  Resp:  [14-16] 14  BP: (112-113)/(66-74) 113/74    Input/Output:  No intake or output data in the 24 hours ending 25 0655      Constitutional: comfortable  Abdomen: soft, nontender, nondistended  Uterus: fundus firm and 2 cm below umbilicus,   Extremities: No calf tenderness; no c/c/e      Results:   Labs / Path / Radiology:    No results found for this or any previous visit (from the past 24 hours).    Specimens (From admission, onward)      None            No results found.      Assessment/Plan   35 year oldyo  , s/p spontaneous vaginal, PPD# 2     Problem List[1].    Postpartum:  -Pt doing well  -Pain tolerable and controlled  -Breastfeeding, lactation consultant available fo assistance    2. Heme:  Hgb s/p delivery 10.2  Acute blood loss anemia  Asymptomatic and hemodynamically stable  Will encourage cont PNV and increase intake of iron rich foods    3. Disposition:  discharge home with discharge instructions reviewed in detail    Pt comfortable about going home, discharge home today  Continue motrin OTC  Home instructions given and pt verbalized understanding  Pt to call the office and schedule an appt in 6 wks for PP visit  If any concerns or questions arise, pt to call the office and make an appt sooner for reevaluation.      Circumcision completed      Cat  MD Martina  5/18/2025  6:55 AM           [1]   Patient Active Problem List  Diagnosis    Recurrent pregnancy loss    Pregnancy (HCC)

## 2025-06-06 ENCOUNTER — TELEPHONE (OUTPATIENT)
Dept: OBGYN UNIT | Facility: HOSPITAL | Age: 35
End: 2025-06-06

## (undated) NOTE — LETTER
[facility]  Administration of Anesthesia  ISondra agree to be cared for by a physician anesthesiologist alone and/or with a nurse anesthetist, who is specially trained to monitor me and give me medicine to put me to sleep or keep me comfortable during my procedure    I understand that my anesthesiologist and/or anesthetist is not an employee or agent of [facility] or [employer]. He or she works for [facility]    As the patient asking for anesthesia services, I agree to:  Allow the anesthesiologist (anesthesia doctor) to give me medicine and do additional procedures as necessary. Some examples are: Starting or using an “IV” to give me medicine, fluids or blood during my procedure, and having a breathing tube placed to help me breathe when I’m asleep (intubation). In the event that my heart stops working properly, I understand that my anesthesiologist will make every effort to sustain my life, unless otherwise directed by [facility] Do Not Resuscitate documents.  Tell my anesthesia doctor before my procedure:  If I am pregnant.  The last time that I ate or drank.  iii. All of the medicines I take (including prescriptions, herbal supplements, and pills I can buy without a prescription (including street drugs/illegal medications). Failure to inform my anesthesiologist about these medicines may increase my risk of anesthetic complications.  iv.If I am allergic to anything or have had a reaction to anesthesia before.  I understand how the anesthesia medicine will help me (benefits).  I understand that with any type of anesthesia medicine there are risks:  The most common risks are: nausea, vomiting, sore throat, muscle soreness, damage to my eyes, mouth, or teeth (from breathing tube placement).  Rare risks include: remembering what happened during my procedure, allergic reactions to medications, injury to my airway, heart, lungs, vision, nerves, or  muscles and in extremely rare instances death.  My doctor has explained to me other choices available to me for my care (alternatives).  Pregnant Patients (“epidural”):  I understand that the risks of having an epidural (medicine given into my back to help control pain during labor), include itching, low blood pressure, difficulty urinating, headache or slowing of the baby’s heart. Very rare risks include infection, bleeding, seizure, irregular heart rhythms and nerve injury.  Regional Anesthesia (“spinal”, “epidural”, & “nerve blocks”):  I understand that rare but potential complications include headache, bleeding, infection, seizure, irregular heart rhythms, and nerve injury.    _____________________________________________________________________________  Patient (or Representative) Signature/Relationship to Patient  Date   Time    _____________________________________________________________________________   Name (if used)    Language/Organization   Time    _____________________________________________________________________________  Nurse Anesthetist Signature     Date   Time  _____________________________________________________________________________  Anesthesiologist Signature     Date   Time  I have discussed the procedure and information above with the patient (or patient’s representative) and answered their questions. The patient or their representative has agreed to have anesthesia services.    _____________________________________________________________________________  Witness        Date   Time  I have verified that the signature is that of the patient or patient’s representative, and that it was signed before the procedure  Patient Name: Sondra Nichols     : 3/25/1990                 Printed: 5/15/2025 at 5:43 PM    Medical Record #: [id number]                                            Page 1 of 1  ----------ANESTHESIA CONSENT----------